# Patient Record
Sex: MALE | Race: WHITE | NOT HISPANIC OR LATINO | Employment: FULL TIME | URBAN - METROPOLITAN AREA
[De-identification: names, ages, dates, MRNs, and addresses within clinical notes are randomized per-mention and may not be internally consistent; named-entity substitution may affect disease eponyms.]

---

## 2024-10-16 ENCOUNTER — LAB (OUTPATIENT)
Dept: LAB | Facility: LAB | Age: 64
End: 2024-10-16

## 2024-10-16 ENCOUNTER — APPOINTMENT (OUTPATIENT)
Dept: PRIMARY CARE | Facility: CLINIC | Age: 64
End: 2024-10-16
Payer: COMMERCIAL

## 2024-10-16 VITALS
HEIGHT: 67 IN | BODY MASS INDEX: 28.41 KG/M2 | RESPIRATION RATE: 14 BRPM | OXYGEN SATURATION: 99 % | SYSTOLIC BLOOD PRESSURE: 170 MMHG | HEART RATE: 85 BPM | WEIGHT: 181 LBS | DIASTOLIC BLOOD PRESSURE: 88 MMHG

## 2024-10-16 DIAGNOSIS — Z00.00 ROUTINE HEALTH MAINTENANCE: ICD-10-CM

## 2024-10-16 DIAGNOSIS — Z12.5 ENCOUNTER FOR PROSTATE CANCER SCREENING: ICD-10-CM

## 2024-10-16 DIAGNOSIS — Z12.11 SCREEN FOR COLON CANCER: ICD-10-CM

## 2024-10-16 DIAGNOSIS — R79.89 ELEVATED TSH: ICD-10-CM

## 2024-10-16 DIAGNOSIS — E78.5 HYPERLIPIDEMIA, UNSPECIFIED HYPERLIPIDEMIA TYPE: ICD-10-CM

## 2024-10-16 DIAGNOSIS — Z00.00 ROUTINE HEALTH MAINTENANCE: Primary | ICD-10-CM

## 2024-10-16 DIAGNOSIS — I10 PRIMARY HYPERTENSION: ICD-10-CM

## 2024-10-16 PROCEDURE — 36415 COLL VENOUS BLD VENIPUNCTURE: CPT

## 2024-10-16 PROCEDURE — 85027 COMPLETE CBC AUTOMATED: CPT

## 2024-10-16 PROCEDURE — 80053 COMPREHEN METABOLIC PANEL: CPT

## 2024-10-16 PROCEDURE — 87389 HIV-1 AG W/HIV-1&-2 AB AG IA: CPT

## 2024-10-16 PROCEDURE — 3077F SYST BP >= 140 MM HG: CPT | Performed by: STUDENT IN AN ORGANIZED HEALTH CARE EDUCATION/TRAINING PROGRAM

## 2024-10-16 PROCEDURE — 1036F TOBACCO NON-USER: CPT | Performed by: STUDENT IN AN ORGANIZED HEALTH CARE EDUCATION/TRAINING PROGRAM

## 2024-10-16 PROCEDURE — 3008F BODY MASS INDEX DOCD: CPT | Performed by: STUDENT IN AN ORGANIZED HEALTH CARE EDUCATION/TRAINING PROGRAM

## 2024-10-16 PROCEDURE — 3079F DIAST BP 80-89 MM HG: CPT | Performed by: STUDENT IN AN ORGANIZED HEALTH CARE EDUCATION/TRAINING PROGRAM

## 2024-10-16 PROCEDURE — 86803 HEPATITIS C AB TEST: CPT

## 2024-10-16 PROCEDURE — 84153 ASSAY OF PSA TOTAL: CPT

## 2024-10-16 PROCEDURE — 84439 ASSAY OF FREE THYROXINE: CPT

## 2024-10-16 PROCEDURE — 99204 OFFICE O/P NEW MOD 45 MIN: CPT | Performed by: STUDENT IN AN ORGANIZED HEALTH CARE EDUCATION/TRAINING PROGRAM

## 2024-10-16 PROCEDURE — 84443 ASSAY THYROID STIM HORMONE: CPT

## 2024-10-16 RX ORDER — LOSARTAN POTASSIUM AND HYDROCHLOROTHIAZIDE 12.5; 5 MG/1; MG/1
1 TABLET ORAL DAILY
Qty: 30 TABLET | Refills: 2 | Status: SHIPPED | OUTPATIENT
Start: 2024-10-16 | End: 2025-10-16

## 2024-10-16 ASSESSMENT — ENCOUNTER SYMPTOMS
DIARRHEA: 0
LIGHT-HEADEDNESS: 0
NAUSEA: 0
DIZZINESS: 0
ABDOMINAL PAIN: 0
DIAPHORESIS: 0
FEVER: 0
VOMITING: 0
SHORTNESS OF BREATH: 0
UNEXPECTED WEIGHT CHANGE: 0
CHILLS: 0

## 2024-10-16 NOTE — PATIENT INSTRUCTIONS
Please get all the lab work except for the lipid panel.   Come back another day to get the lipid panel in 1 month- you must fast at least 8 hours before you get this. When you get this you will also need to get the BMP - basic metabolic panel.

## 2024-10-16 NOTE — PROGRESS NOTES
"Subjective   Patient ID: Darron Cotton is a 63 y.o. male who presents for Establish Care.  HPI    Here to establish care.  He has no acute concerns today.    Reports SBP never over 140 in past.         Review of Systems   Constitutional:  Negative for chills, diaphoresis, fever and unexpected weight change.   Respiratory:  Negative for shortness of breath.    Cardiovascular:  Negative for chest pain.        No SOB or CP going up 2 flights of stairs     Gastrointestinal:  Negative for abdominal pain, diarrhea, nausea and vomiting.   Neurological:  Negative for dizziness, syncope and light-headedness.       /88   Pulse 85   Resp 14   Ht 1.702 m (5' 7\")   Wt 82.1 kg (181 lb)   SpO2 99%   BMI 28.35 kg/m²     Objective   Physical Exam  Vitals reviewed.   Constitutional:       General: He is not in acute distress.     Appearance: Normal appearance.   HENT:      Head: Normocephalic.   Cardiovascular:      Rate and Rhythm: Normal rate and regular rhythm.   Pulmonary:      Effort: Pulmonary effort is normal. No respiratory distress.      Breath sounds: Normal breath sounds.   Abdominal:      General: There is no distension.   Musculoskeletal:         General: No deformity.   Skin:     Coloration: Skin is not jaundiced.   Neurological:      Mental Status: He is alert.         Assessment/Plan   Problem List Items Addressed This Visit       HLD (hyperlipidemia)    Primary hypertension    Relevant Medications    losartan-hydrochlorothiazide (Hyzaar) 50-12.5 mg tablet    Other Relevant Orders    Basic metabolic panel    Encounter for prostate cancer screening    Relevant Orders    Prostate Spec.Ag,Screen    Screen for colon cancer    Relevant Orders    Colonoscopy Screening; Average Risk Patient    Elevated TSH     Other Visit Diagnoses       Routine health maintenance    -  Primary    Relevant Orders    CBC    Comprehensive Metabolic Panel    TSH with reflex to Free T4 if abnormal    HIV 1/2 Antigen/Antibody Screen " with Reflex to Confirmation    Hepatitis C Antibody    Lipid Panel            Primary hypertension  -Blood pressure taken twice in the office was elevated over 160.  - Start losartan-hydrochlorothiazide, advised to get labs prior to starting today  - Monitor at home, let us know blood pressures in the next 1 to 2 weeks  - Advised common side effects lightheadedness and dizziness, usually gets better within a day or 2.  Let us know if persistent.  - Will need repeat BMP 1 month, ordered  - DASH diet advised  - Follow-up 1 month    Hyperlipidemia  - Last LDL was 169 in August 2018, total cholesterol was 225 at that time as well.  - Repeat fasting lipid panel ordered    Elevated TSH  - Repeat TSH ordered.    Health Maintenance  -Prostate Cancer Screening: Ordered, discussed risk and benefits of screening.  -Vaccinations: He defers flu vaccine, discuss at next appt.   -Lung Cancer Screening: Not indicated  -AAA Screening: Age 67  -Colonoscopy: Ordered  -Screen for HIV/Hep C: Ordered  -Advance Care Planning: He wants his wife Gay to make decisions for him if he is unable to.     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   Follow-up 1 month for CPE, sooner if needed

## 2024-10-17 ENCOUNTER — TELEPHONE (OUTPATIENT)
Dept: PRIMARY CARE | Facility: CLINIC | Age: 64
End: 2024-10-17

## 2024-10-17 LAB
ALBUMIN SERPL BCP-MCNC: 4.8 G/DL (ref 3.4–5)
ALP SERPL-CCNC: 52 U/L (ref 33–136)
ALT SERPL W P-5'-P-CCNC: 22 U/L (ref 10–52)
ANION GAP SERPL CALC-SCNC: 13 MMOL/L (ref 10–20)
AST SERPL W P-5'-P-CCNC: 20 U/L (ref 9–39)
BILIRUB SERPL-MCNC: 0.8 MG/DL (ref 0–1.2)
BUN SERPL-MCNC: 16 MG/DL (ref 6–23)
CALCIUM SERPL-MCNC: 10 MG/DL (ref 8.6–10.6)
CHLORIDE SERPL-SCNC: 102 MMOL/L (ref 98–107)
CO2 SERPL-SCNC: 29 MMOL/L (ref 21–32)
CREAT SERPL-MCNC: 1 MG/DL (ref 0.5–1.3)
EGFRCR SERPLBLD CKD-EPI 2021: 86 ML/MIN/1.73M*2
ERYTHROCYTE [DISTWIDTH] IN BLOOD BY AUTOMATED COUNT: 12.1 % (ref 11.5–14.5)
GLUCOSE SERPL-MCNC: 89 MG/DL (ref 74–99)
HCT VFR BLD AUTO: 48.7 % (ref 41–52)
HCV AB SER QL: NONREACTIVE
HGB BLD-MCNC: 16.7 G/DL (ref 13.5–17.5)
HIV 1+2 AB+HIV1 P24 AG SERPL QL IA: NONREACTIVE
MCH RBC QN AUTO: 29.6 PG (ref 26–34)
MCHC RBC AUTO-ENTMCNC: 34.3 G/DL (ref 32–36)
MCV RBC AUTO: 86 FL (ref 80–100)
NRBC BLD-RTO: 0 /100 WBCS (ref 0–0)
PLATELET # BLD AUTO: 257 X10*3/UL (ref 150–450)
POTASSIUM SERPL-SCNC: 4.4 MMOL/L (ref 3.5–5.3)
PROT SERPL-MCNC: 7.6 G/DL (ref 6.4–8.2)
PSA SERPL-MCNC: 0.4 NG/ML
RBC # BLD AUTO: 5.64 X10*6/UL (ref 4.5–5.9)
SODIUM SERPL-SCNC: 140 MMOL/L (ref 136–145)
T4 FREE SERPL-MCNC: 0.93 NG/DL (ref 0.78–1.48)
TSH SERPL-ACNC: 22.53 MIU/L (ref 0.44–3.98)
WBC # BLD AUTO: 10.4 X10*3/UL (ref 4.4–11.3)

## 2024-10-17 NOTE — TELEPHONE ENCOUNTER
----- Message from Ashkan Persaud sent at 10/17/2024  8:15 AM EDT -----  Please let him know his thyroid hormone is hypoactive and he needs to be on medicine for this.  Hepatitis C and HIV test were negative.  Prostate level was normal.  Blood counts kidney liver electrolytes are normal.  Happy to do a virtual with him to talk about thyroid in more detail.

## 2024-10-21 ENCOUNTER — APPOINTMENT (OUTPATIENT)
Dept: PRIMARY CARE | Facility: CLINIC | Age: 64
End: 2024-10-21
Payer: COMMERCIAL

## 2024-10-21 DIAGNOSIS — E03.8 SUBCLINICAL HYPOTHYROIDISM: ICD-10-CM

## 2024-10-21 DIAGNOSIS — R79.89 ELEVATED TSH: Primary | ICD-10-CM

## 2024-10-21 PROCEDURE — 99213 OFFICE O/P EST LOW 20 MIN: CPT | Performed by: STUDENT IN AN ORGANIZED HEALTH CARE EDUCATION/TRAINING PROGRAM

## 2024-10-21 RX ORDER — LEVOTHYROXINE SODIUM 25 UG/1
25 TABLET ORAL DAILY
Qty: 30 TABLET | Refills: 2 | Status: SHIPPED | OUTPATIENT
Start: 2024-10-21 | End: 2025-10-21

## 2024-10-21 ASSESSMENT — ENCOUNTER SYMPTOMS
DYSURIA: 0
DIARRHEA: 0
VOMITING: 0
POLYDIPSIA: 0
NECK PAIN: 0
SHORTNESS OF BREATH: 0
NAUSEA: 0
FEVER: 0
POLYPHAGIA: 0
CHILLS: 0
DIAPHORESIS: 0

## 2024-10-21 NOTE — PROGRESS NOTES
Subjective   Patient ID: Darron Cotton is a 63 y.o. male who presents for Results.  HPI    Virtual or Telephone Consent    An interactive audio and video telecommunication system which permits real time communications between the patient (at the originating site) and provider (at the distant site) was utilized to provide this telehealth service.   Verbal consent was requested and obtained from Darron Cotton on this date, 10/24/24 for a telehealth visit.     Denies any recent illness other than a cold in August.  He is feeling well and denies any concerns at this point.  We discussed his thyroid labs in detail.        Review of Systems   Constitutional:  Negative for chills, diaphoresis and fever.   HENT:  Negative for hearing loss.    Eyes:  Negative for visual disturbance.   Respiratory:  Negative for shortness of breath.    Cardiovascular:  Negative for chest pain.        No SOB or CP going up 2 flights of stairs   Gastrointestinal:  Negative for diarrhea, nausea and vomiting.   Endocrine: Negative for cold intolerance, heat intolerance (very mild a/w age), polydipsia, polyphagia and polyuria.        No breast enlargement, breast tenderness, or breast discharge.  Denies weakness, lethargy, hair falling out, or fatigue.    Genitourinary:  Negative for dysuria and urgency.   Musculoskeletal:  Negative for neck pain.        No neck swelling   Skin:  Negative for rash.       There were no vitals taken for this visit.    Objective   Physical Exam  Constitutional:       General: He is not in acute distress.  Pulmonary:      Effort: Pulmonary effort is normal. No respiratory distress.   Neurological:      Mental Status: He is alert.         Assessment/Plan   Problem List Items Addressed This Visit       Elevated TSH - Primary     Other Visit Diagnoses       Subclinical hypothyroidism        Relevant Medications    levothyroxine (Synthroid, Levoxyl) 25 mcg tablet    Other Relevant Orders    TSH with reflex to Free T4 if  abnormal    Thyroid Peroxidase (TPO) Antibody              Elevated TSH  - TSH 22.53, free T4 normal, explained these results to patient.  - Recommend starting levothyroxine, discussed side effects of this therapy as well as benefits.  Discussed risk of cardiovascular side effects however given his TSH elevation these would be rare and outweighed by the benefit of thyroid supplementation. Discussed to take in the morning on an empty stomach at least 30 minutes before other medications and food.  Discussed should take 4 hours prior to any iron or calcium containing supplements.  - Start levothyroxine 25 mcg once a day  - Repeat TSH in about 4 to 6 weeks.  TPO antibodies ordered.

## 2024-10-28 DIAGNOSIS — Z12.11 COLON CANCER SCREENING: Primary | ICD-10-CM

## 2024-10-28 RX ORDER — SODIUM, POTASSIUM,MAG SULFATES 17.5-3.13G
SOLUTION, RECONSTITUTED, ORAL ORAL
Qty: 354 ML | Refills: 0 | Status: SHIPPED | OUTPATIENT
Start: 2024-10-28

## 2024-12-06 ENCOUNTER — LAB (OUTPATIENT)
Dept: LAB | Facility: LAB | Age: 64
End: 2024-12-06
Payer: COMMERCIAL

## 2024-12-06 DIAGNOSIS — I10 PRIMARY HYPERTENSION: ICD-10-CM

## 2024-12-06 DIAGNOSIS — Z00.00 ROUTINE HEALTH MAINTENANCE: ICD-10-CM

## 2024-12-06 DIAGNOSIS — E03.8 SUBCLINICAL HYPOTHYROIDISM: ICD-10-CM

## 2024-12-06 PROCEDURE — 80048 BASIC METABOLIC PNL TOTAL CA: CPT

## 2024-12-06 PROCEDURE — 84443 ASSAY THYROID STIM HORMONE: CPT

## 2024-12-06 PROCEDURE — 84439 ASSAY OF FREE THYROXINE: CPT

## 2024-12-06 PROCEDURE — 36415 COLL VENOUS BLD VENIPUNCTURE: CPT

## 2024-12-06 PROCEDURE — 86376 MICROSOMAL ANTIBODY EACH: CPT

## 2024-12-06 PROCEDURE — 80061 LIPID PANEL: CPT

## 2024-12-07 LAB
ANION GAP SERPL CALC-SCNC: 13 MMOL/L (ref 10–20)
BUN SERPL-MCNC: 11 MG/DL (ref 6–23)
CALCIUM SERPL-MCNC: 9.6 MG/DL (ref 8.6–10.6)
CHLORIDE SERPL-SCNC: 103 MMOL/L (ref 98–107)
CHOLEST SERPL-MCNC: 216 MG/DL (ref 0–199)
CHOLESTEROL/HDL RATIO: 5.4
CO2 SERPL-SCNC: 28 MMOL/L (ref 21–32)
CREAT SERPL-MCNC: 0.86 MG/DL (ref 0.5–1.3)
EGFRCR SERPLBLD CKD-EPI 2021: >90 ML/MIN/1.73M*2
GLUCOSE SERPL-MCNC: 79 MG/DL (ref 74–99)
HDLC SERPL-MCNC: 40.3 MG/DL
LDLC SERPL CALC-MCNC: 147 MG/DL
NON HDL CHOLESTEROL: 176 MG/DL (ref 0–149)
POTASSIUM SERPL-SCNC: 3.7 MMOL/L (ref 3.5–5.3)
SODIUM SERPL-SCNC: 140 MMOL/L (ref 136–145)
T4 FREE SERPL-MCNC: 1.03 NG/DL (ref 0.78–1.48)
THYROPEROXIDASE AB SERPL-ACNC: >1000 IU/ML
TRIGL SERPL-MCNC: 142 MG/DL (ref 0–149)
TSH SERPL-ACNC: 15.85 MIU/L (ref 0.44–3.98)
VLDL: 28 MG/DL (ref 0–40)

## 2024-12-09 ENCOUNTER — APPOINTMENT (OUTPATIENT)
Dept: PRIMARY CARE | Facility: CLINIC | Age: 64
End: 2024-12-09

## 2024-12-09 VITALS
DIASTOLIC BLOOD PRESSURE: 96 MMHG | TEMPERATURE: 96.6 F | OXYGEN SATURATION: 98 % | SYSTOLIC BLOOD PRESSURE: 159 MMHG | HEART RATE: 73 BPM | HEIGHT: 67 IN | WEIGHT: 182.8 LBS | BODY MASS INDEX: 28.69 KG/M2

## 2024-12-09 DIAGNOSIS — E03.9 HYPOTHYROIDISM, UNSPECIFIED TYPE: Primary | ICD-10-CM

## 2024-12-09 DIAGNOSIS — I10 PRIMARY HYPERTENSION: ICD-10-CM

## 2024-12-09 DIAGNOSIS — E78.5 HYPERLIPIDEMIA, UNSPECIFIED HYPERLIPIDEMIA TYPE: ICD-10-CM

## 2024-12-09 DIAGNOSIS — E03.8 SUBCLINICAL HYPOTHYROIDISM: ICD-10-CM

## 2024-12-09 DIAGNOSIS — Z00.00 HEALTHCARE MAINTENANCE: ICD-10-CM

## 2024-12-09 PROCEDURE — 3080F DIAST BP >= 90 MM HG: CPT | Performed by: STUDENT IN AN ORGANIZED HEALTH CARE EDUCATION/TRAINING PROGRAM

## 2024-12-09 PROCEDURE — 3008F BODY MASS INDEX DOCD: CPT | Performed by: STUDENT IN AN ORGANIZED HEALTH CARE EDUCATION/TRAINING PROGRAM

## 2024-12-09 PROCEDURE — 3077F SYST BP >= 140 MM HG: CPT | Performed by: STUDENT IN AN ORGANIZED HEALTH CARE EDUCATION/TRAINING PROGRAM

## 2024-12-09 PROCEDURE — 93000 ELECTROCARDIOGRAM COMPLETE: CPT | Performed by: STUDENT IN AN ORGANIZED HEALTH CARE EDUCATION/TRAINING PROGRAM

## 2024-12-09 PROCEDURE — 99396 PREV VISIT EST AGE 40-64: CPT | Performed by: STUDENT IN AN ORGANIZED HEALTH CARE EDUCATION/TRAINING PROGRAM

## 2024-12-09 RX ORDER — LOSARTAN POTASSIUM AND HYDROCHLOROTHIAZIDE 25; 100 MG/1; MG/1
1 TABLET ORAL DAILY
Qty: 30 TABLET | Refills: 11 | Status: SHIPPED | OUTPATIENT
Start: 2024-12-09 | End: 2024-12-09

## 2024-12-09 RX ORDER — LOSARTAN POTASSIUM AND HYDROCHLOROTHIAZIDE 25; 100 MG/1; MG/1
1 TABLET ORAL DAILY
Qty: 30 TABLET | Refills: 3 | Status: SHIPPED | OUTPATIENT
Start: 2024-12-09 | End: 2025-04-08

## 2024-12-09 RX ORDER — LEVOTHYROXINE SODIUM 50 UG/1
50 TABLET ORAL DAILY
Qty: 30 TABLET | Refills: 2 | Status: SHIPPED | OUTPATIENT
Start: 2024-12-09 | End: 2025-12-09

## 2024-12-09 ASSESSMENT — ENCOUNTER SYMPTOMS
FEVER: 0
DIZZINESS: 0
VOMITING: 0
DIAPHORESIS: 0
CHILLS: 0
SHORTNESS OF BREATH: 0
DYSURIA: 0
MYALGIAS: 0
HEMATURIA: 0
LIGHT-HEADEDNESS: 0
NAUSEA: 0
DIARRHEA: 0

## 2024-12-09 ASSESSMENT — PATIENT HEALTH QUESTIONNAIRE - PHQ9
2. FEELING DOWN, DEPRESSED OR HOPELESS: NOT AT ALL
1. LITTLE INTEREST OR PLEASURE IN DOING THINGS: NOT AT ALL
SUM OF ALL RESPONSES TO PHQ9 QUESTIONS 1 AND 2: 0

## 2024-12-09 NOTE — PROGRESS NOTES
"Subjective   Patient ID: Darron Cotton is a 64 y.o. male who presents for Annual Exam.  HPI    Has decreased erections. Does not get morning erections. Has been present for at least 6 months. Sleeps ok at night, intermittent snoring.       Review of Systems   Constitutional:  Negative for chills, diaphoresis and fever.   HENT:  Negative for hearing loss.    Eyes:  Negative for visual disturbance.   Respiratory:  Negative for shortness of breath.    Cardiovascular:  Negative for chest pain.        No SOB or CP going up 2 flights of stairs   Gastrointestinal:  Negative for diarrhea, nausea and vomiting.   Genitourinary:  Negative for dysuria, genital sores, hematuria, penile discharge, penile pain, penile swelling, scrotal swelling, testicular pain and urgency.        Wakes up at night to urinate about once     Musculoskeletal:  Negative for myalgias.   Skin:  Negative for rash.   Neurological:  Negative for dizziness, syncope and light-headedness.       BP (!) 166/97   Pulse 73   Temp 35.9 °C (96.6 °F)   Ht 1.702 m (5' 7\")   Wt 82.9 kg (182 lb 12.8 oz)   SpO2 98%   BMI 28.63 kg/m²     Objective   Physical Exam  Vitals reviewed.   Constitutional:       General: He is not in acute distress.     Appearance: Normal appearance.   HENT:      Head: Normocephalic.      Right Ear: Tympanic membrane, ear canal and external ear normal. There is no impacted cerumen.      Left Ear: Tympanic membrane, ear canal and external ear normal. There is no impacted cerumen.      Mouth/Throat:      Mouth: Mucous membranes are moist.      Pharynx: Oropharynx is clear. No oropharyngeal exudate or posterior oropharyngeal erythema.   Cardiovascular:      Rate and Rhythm: Normal rate and regular rhythm.   Pulmonary:      Effort: Pulmonary effort is normal. No respiratory distress.      Breath sounds: Normal breath sounds.   Abdominal:      General: Bowel sounds are normal. There is no distension.      Palpations: Abdomen is soft. There is no " mass.      Tenderness: There is no abdominal tenderness. There is no guarding or rebound.   Musculoskeletal:         General: No deformity.      Cervical back: Neck supple. No tenderness.   Lymphadenopathy:      Cervical: No cervical adenopathy.   Skin:     Coloration: Skin is not jaundiced.   Neurological:      Mental Status: He is alert.         Assessment/Plan   Problem List Items Addressed This Visit       Primary hypertension    Relevant Medications    losartan-hydrochlorothiazide (Hyzaar) 100-25 mg tablet     Other Visit Diagnoses       Hypothyroidism, unspecified type    -  Primary    Relevant Orders    TSH with reflex to Free T4 if abnormal    Subclinical hypothyroidism        Relevant Medications    levothyroxine (Synthroid, Levoxyl) 50 mcg tablet              Elevated TSH  - TSH 22.53->15.85  - On levothyroxine 25 mcg once a day, increase to 50 mcg a day.  - Repeat TSH in about 4 to 6 weeks, ordered     Primary hypertension  -Blood pressure taken twice in the office was elevated over 160.  - On losartan-hydrochlorothiazide 50-12.5, increase to 100-25mg/day.   - Monitor at home, let us know blood pressures in the next 1 to 2 weeks, hasn't started checking.   - Advised common side effects lightheadedness and dizziness, usually gets better within a day or 2.  Let us know if persistent.  - BMP 1 month after starting was normal.   - DASH diet advised  - Follow-up 1 month     Hyperlipidemia  - Repeat lipids still up.   - Consider calcium score in future  -Will need statin, discuss at next visit given med changes today.      Health Maintenance  -Prostate Cancer Screening: Negative 10/24  -Vaccinations: He defers flu vaccine, discuss at next appt. Recommend shingrix, TDAP, covid booster.   -Lung Cancer Screening: Not indicated  -AAA Screening: Age 67  -Colonoscopy: Ordered  -Screen for HIV/Hep C: 10/16/24  -Advance Care Planning: He wants his wife Gay to make decisions for him if he is unable to.      CPE  completed.  Advised to keep a heart healthy, low fat  diet with fruits and veggies like Mediterranean diet.  Advised on the importance of exercise and maintaining 150 minutes of exercise per week (30 minutes per day 5 days a week).  Advised on regular eye and dental visits.  Discussed age appropriate cancer screening, immunizations and recommendations given.  Discussed avoiding illicit drugs and tobacco. Advised on appropriate use of alcohol.  Advised to wear seat belt.      CPE 1 year  F/u 4-6 weeks HTN

## 2024-12-10 PROBLEM — E03.9 HYPOTHYROIDISM: Status: ACTIVE | Noted: 2024-12-10

## 2025-01-06 ENCOUNTER — TELEPHONE (OUTPATIENT)
Dept: SURGERY | Facility: CLINIC | Age: 65
End: 2025-01-06
Payer: COMMERCIAL

## 2025-01-06 NOTE — TELEPHONE ENCOUNTER
Called and spoke with patient regarding a scheduling note about being on a blood thinner.  Upon talking to patient he suggested I call his wife as he was not at home.  I spoke with his wife who reviewed his medications and stated that the patient is taking a 81 mg Aspirin.  I informed his wife that I would be in contact once I hear from Dr. Newby.  I discussed with Dr. Newby who stated that it does not need to be held prior to his procedure.  Patient's wife was notified that patient does not need to hold his Aspirin 81 mg for his colonoscopy.  All questions were addressed and answered.

## 2025-01-14 ASSESSMENT — ENCOUNTER SYMPTOMS: HYPERTENSION: 1

## 2025-01-15 ENCOUNTER — ANESTHESIA EVENT (OUTPATIENT)
Dept: OPERATING ROOM | Facility: CLINIC | Age: 65
End: 2025-01-15
Payer: COMMERCIAL

## 2025-01-15 ENCOUNTER — ANESTHESIA (OUTPATIENT)
Dept: OPERATING ROOM | Facility: CLINIC | Age: 65
End: 2025-01-15
Payer: COMMERCIAL

## 2025-01-15 ENCOUNTER — HOSPITAL ENCOUNTER (OUTPATIENT)
Dept: OPERATING ROOM | Facility: CLINIC | Age: 65
Setting detail: OUTPATIENT SURGERY
Discharge: HOME | End: 2025-01-15
Payer: COMMERCIAL

## 2025-01-15 VITALS
HEIGHT: 67 IN | DIASTOLIC BLOOD PRESSURE: 92 MMHG | RESPIRATION RATE: 16 BRPM | OXYGEN SATURATION: 98 % | TEMPERATURE: 97.5 F | BODY MASS INDEX: 28.03 KG/M2 | WEIGHT: 178.57 LBS | SYSTOLIC BLOOD PRESSURE: 151 MMHG | HEART RATE: 76 BPM

## 2025-01-15 DIAGNOSIS — Z12.11 SCREEN FOR COLON CANCER: ICD-10-CM

## 2025-01-15 PROCEDURE — 3600000007 HC OR TIME - EACH INCREMENTAL 1 MINUTE - PROCEDURE LEVEL TWO: Performed by: NURSE ANESTHETIST, CERTIFIED REGISTERED

## 2025-01-15 PROCEDURE — 3700000001 HC GENERAL ANESTHESIA TIME - INITIAL BASE CHARGE: Performed by: NURSE ANESTHETIST, CERTIFIED REGISTERED

## 2025-01-15 PROCEDURE — 2500000004 HC RX 250 GENERAL PHARMACY W/ HCPCS (ALT 636 FOR OP/ED): Performed by: NURSE ANESTHETIST, CERTIFIED REGISTERED

## 2025-01-15 PROCEDURE — 7100000009 HC PHASE TWO TIME - INITIAL BASE CHARGE: Performed by: NURSE ANESTHETIST, CERTIFIED REGISTERED

## 2025-01-15 PROCEDURE — 3700000002 HC GENERAL ANESTHESIA TIME - EACH INCREMENTAL 1 MINUTE: Performed by: NURSE ANESTHETIST, CERTIFIED REGISTERED

## 2025-01-15 PROCEDURE — 7100000010 HC PHASE TWO TIME - EACH INCREMENTAL 1 MINUTE: Performed by: NURSE ANESTHETIST, CERTIFIED REGISTERED

## 2025-01-15 PROCEDURE — A45385 PR COLONOSCOPY,REMV LESN,SNARE: Performed by: NURSE ANESTHETIST, CERTIFIED REGISTERED

## 2025-01-15 PROCEDURE — 45385 COLONOSCOPY W/LESION REMOVAL: CPT | Performed by: SURGERY

## 2025-01-15 PROCEDURE — 3600000002 HC OR TIME - INITIAL BASE CHARGE - PROCEDURE LEVEL TWO: Performed by: NURSE ANESTHETIST, CERTIFIED REGISTERED

## 2025-01-15 RX ORDER — PROPOFOL 10 MG/ML
INJECTION, EMULSION INTRAVENOUS AS NEEDED
Status: DISCONTINUED | OUTPATIENT
Start: 2025-01-15 | End: 2025-01-15

## 2025-01-15 RX ORDER — LIDOCAINE HYDROCHLORIDE 20 MG/ML
INJECTION, SOLUTION INFILTRATION; PERINEURAL AS NEEDED
Status: DISCONTINUED | OUTPATIENT
Start: 2025-01-15 | End: 2025-01-15

## 2025-01-15 RX ADMIN — PROPOFOL 100 MG: 10 INJECTION, EMULSION INTRAVENOUS at 12:54

## 2025-01-15 RX ADMIN — LIDOCAINE HYDROCHLORIDE 100 MG: 20 INJECTION, SOLUTION INFILTRATION; PERINEURAL at 12:54

## 2025-01-15 RX ADMIN — PROPOFOL 200 MCG/KG/MIN: 10 INJECTION, EMULSION INTRAVENOUS at 12:55

## 2025-01-15 SDOH — HEALTH STABILITY: MENTAL HEALTH: CURRENT SMOKER: 0

## 2025-01-15 ASSESSMENT — ENCOUNTER SYMPTOMS
VOMITING: 0
COUGH: 0
SPEECH DIFFICULTY: 0
CONSTIPATION: 0
NAUSEA: 0
TROUBLE SWALLOWING: 0
FEVER: 0
UNEXPECTED WEIGHT CHANGE: 0
DIARRHEA: 0
COLOR CHANGE: 0
CONFUSION: 0
WHEEZING: 0
CHILLS: 0
ABDOMINAL DISTENTION: 0
HEADACHES: 0
ABDOMINAL PAIN: 0
DIZZINESS: 0
JOINT SWELLING: 0
ARTHRALGIAS: 0
DIFFICULTY URINATING: 0
SLEEP DISTURBANCE: 0
LIGHT-HEADEDNESS: 0
SHORTNESS OF BREATH: 0

## 2025-01-15 ASSESSMENT — COLUMBIA-SUICIDE SEVERITY RATING SCALE - C-SSRS
1. IN THE PAST MONTH, HAVE YOU WISHED YOU WERE DEAD OR WISHED YOU COULD GO TO SLEEP AND NOT WAKE UP?: NO
2. HAVE YOU ACTUALLY HAD ANY THOUGHTS OF KILLING YOURSELF?: NO
6. HAVE YOU EVER DONE ANYTHING, STARTED TO DO ANYTHING, OR PREPARED TO DO ANYTHING TO END YOUR LIFE?: NO

## 2025-01-15 ASSESSMENT — PAIN SCALES - GENERAL
PAINLEVEL_OUTOF10: 0 - NO PAIN
PAIN_LEVEL: 0
PAINLEVEL_OUTOF10: 0 - NO PAIN

## 2025-01-15 ASSESSMENT — PAIN - FUNCTIONAL ASSESSMENT
PAIN_FUNCTIONAL_ASSESSMENT: 0-10

## 2025-01-15 NOTE — ANESTHESIA POSTPROCEDURE EVALUATION
Patient: Darron Cotton    Procedure Summary       Date: 01/15/25 Room / Location: [facility] OR    Anesthesia Start: 1250 Anesthesia Stop: 1329    Procedure: COLONOSCOPY Diagnosis: Screen for colon cancer    Scheduled Providers: Keith Newby MD PhD Responsible Provider: MABEL Moore    Anesthesia Type: MAC ASA Status: 2            Anesthesia Type: MAC    Vitals Value Taken Time   /84 01/15/25 1328   Temp 36.9 °C (98.4 °F) 01/15/25 1328   Pulse 90 01/15/25 1328   Resp 16 01/15/25 1328   SpO2 96 % 01/15/25 1328       Anesthesia Post Evaluation    Patient location during evaluation: PACU  Patient participation: complete - patient participated  Level of consciousness: awake  Pain score: 0  Pain management: adequate  Airway patency: patent  Cardiovascular status: acceptable  Respiratory status: acceptable  Hydration status: acceptable  Postoperative Nausea and Vomiting: none        There were no known notable events for this encounter.

## 2025-01-15 NOTE — ANESTHESIA PREPROCEDURE EVALUATION
Patient: Darron Cotton    Procedure Information       Date/Time: 01/15/25 1250    Scheduled providers: Keith Newby MD PhD    Procedure: COLONOSCOPY    Location: [facility] OR          There were no vitals filed for this visit.    History reviewed. No pertinent surgical history.  History reviewed. No pertinent past medical history.    Current Outpatient Medications:     levothyroxine (Synthroid, Levoxyl) 50 mcg tablet, Take 1 tablet (50 mcg) by mouth early in the morning.. Take on an empty stomach at the same time each day, either 30 to 60 minutes prior to breakfast, Disp: 30 tablet, Rfl: 2    losartan-hydrochlorothiazide (Hyzaar) 100-25 mg tablet, Take 1 tablet by mouth once daily., Disp: 30 tablet, Rfl: 3    sodium,potassium,mag sulfates (Suprep) 17.5-3.13-1.6 gram recon soln solution, Take one bottle twice as directed by the prep instructions, Disp: 354 mL, Rfl: 0  Prior to Admission medications    Medication Sig Start Date End Date Taking? Authorizing Provider   levothyroxine (Synthroid, Levoxyl) 50 mcg tablet Take 1 tablet (50 mcg) by mouth early in the morning.. Take on an empty stomach at the same time each day, either 30 to 60 minutes prior to breakfast 24 Yes Ashkan Persaud,    losartan-hydrochlorothiazide (Hyzaar) 100-25 mg tablet Take 1 tablet by mouth once daily. 24 Yes Ashkan Persaud DO   sodium,potassium,mag sulfates (Suprep) 17.5-3.13-1.6 gram recon soln solution Take one bottle twice as directed by the prep instructions 10/28/24  Yes Keith Newby MD PhD     No Known Allergies  Social History     Tobacco Use    Smoking status: Former     Current packs/day: 0.00     Average packs/day: 0.1 packs/day for 3.0 years (0.3 ttl pk-yrs)     Types: Cigarettes     Start date:      Quit date:      Years since quittin.0    Smokeless tobacco: Never   Substance Use Topics    Alcohol use: Yes     Comment: <1 drink a week         Chemistry    Lab Results  "  Component Value Date/Time     12/06/2024 1331    K 3.7 12/06/2024 1331     12/06/2024 1331    CO2 28 12/06/2024 1331    BUN 11 12/06/2024 1331    CREATININE 0.86 12/06/2024 1331    Lab Results   Component Value Date/Time    CALCIUM 9.6 12/06/2024 1331    ALKPHOS 52 10/16/2024 1651    AST 20 10/16/2024 1651    ALT 22 10/16/2024 1651    BILITOT 0.8 10/16/2024 1651          Lab Results   Component Value Date/Time    WBC 10.4 10/16/2024 1651    HGB 16.7 10/16/2024 1651    HCT 48.7 10/16/2024 1651     10/16/2024 1651     No results found for: \"PROTIME\", \"PTT\", \"INR\"  Encounter Date: 12/09/24   ECG 12 lead (Clinic Performed)    Narrative    Sinus rhythm, no significant abnormalities.      Relevant Problems   Cardiac   (+) HLD (hyperlipidemia)   (+) Primary hypertension      Endocrine   (+) Hypothyroidism       Clinical information reviewed:   Tobacco  Allergies  Meds  Problems  Med Hx  Surg Hx   Fam Hx          NPO Detail:  No data recorded     Physical Exam    Airway  Mallampati: III  TM distance: >3 FB     Cardiovascular - normal exam  Rhythm: regular  Rate: normal     Dental - normal exam     Pulmonary - normal exam     Abdominal - normal exam  Abdomen: soft             Anesthesia Plan    History of general anesthesia?: yes  History of complications of general anesthesia?: no    ASA 2     MAC     The patient is not a current smoker.  Patient was not previously instructed to abstain from smoking on day of procedure.    intravenous induction   Anesthetic plan and risks discussed with patient.      "

## 2025-01-15 NOTE — H&P
History Of Present Illness  Darron Cotton is a 64 y.o. male presenting with need for screening colonoscopy.    Hypothyroid and HTN. Father with pancreatic cancer.     Past Medical History  No past medical history on file.  Surgical History  No past surgical history on file.  Social History  He reports that he quit smoking about 42 years ago. His smoking use included cigarettes. He started smoking about 45 years ago. He has a 0.3 pack-year smoking history. He has never used smokeless tobacco. He reports current alcohol use. He reports current drug use. Drug: Marijuana.    Family History  No family history on file.     Allergies  No Known Allergies  Review of Systems   Constitutional:  Negative for chills, fever and unexpected weight change.   HENT:  Negative for congestion and trouble swallowing.    Respiratory:  Negative for cough, shortness of breath and wheezing.    Cardiovascular:  Negative for chest pain.   Gastrointestinal:  Negative for abdominal distention, abdominal pain, constipation, diarrhea, nausea and vomiting.   Genitourinary:  Negative for difficulty urinating.   Musculoskeletal:  Negative for arthralgias and joint swelling.   Skin:  Negative for color change.   Neurological:  Negative for dizziness, speech difficulty, light-headedness and headaches.   Psychiatric/Behavioral:  Negative for confusion and sleep disturbance.         Physical Exam  Constitutional:       General: He is awake.      Appearance: Normal appearance.   HENT:      Head: Normocephalic and atraumatic.      Nose: Nose normal.      Mouth/Throat:      Mouth: Mucous membranes are moist.   Eyes:      Pupils: Pupils are equal, round, and reactive to light.   Neck:      Thyroid: No thyroid mass.      Trachea: Phonation normal.   Cardiovascular:      Rate and Rhythm: Normal rate and regular rhythm.      Heart sounds: Normal heart sounds. No murmur heard.     No gallop.   Pulmonary:      Effort: Pulmonary effort is normal. No respiratory  distress.      Breath sounds: Normal air entry. No decreased breath sounds, wheezing, rhonchi or rales.   Abdominal:      General: Bowel sounds are normal. There is no distension.      Palpations: Abdomen is soft.      Tenderness: There is no abdominal tenderness.   Musculoskeletal:      Cervical back: Neck supple.      Right lower leg: No edema.      Left lower leg: No edema.   Skin:     General: Skin is warm.      Capillary Refill: Capillary refill takes less than 2 seconds.   Neurological:      General: No focal deficit present.      Mental Status: He is alert and oriented to person, place, and time. Mental status is at baseline.      Cranial Nerves: Cranial nerves 2-12 are intact.      Motor: Motor function is intact.   Psychiatric:         Attention and Perception: Attention and perception normal.         Mood and Affect: Mood normal.         Speech: Speech normal.         Behavior: Behavior normal.          Last Recorded Vitals  There were no vitals taken for this visit.    Assessment/Plan   63 YO M BMI 28 with need for screening colonoscopy.    Consented.     Keith Newby MD PhD

## 2025-01-15 NOTE — DISCHARGE INSTRUCTIONS
During the first 24 hours after your procedure, you should:    - Resume normal diet, unless otherwise directed by your doctor.  - Resume your home medications, unless otherwise directed by your doctor.  - Refrain from driving or operative heavy machinery.  - Drink plenty of liquids.  - Avoid consuming alcohol.  - Avoid strenuous activity or heavy lifting.    After 24 hours, you can resume regular activity.    Call your doctor office immediately ([phone]) or come to the nearest emergency room if you experience:    - Abdominal tenderness  - Blood in your stool or vomit  - Difficulty urinating or passing stools  - Difficulty breathing  - Chest pain  - Fever       If you experience any problems or have any questions following discharge from the GI Lab, please call:   Dr. Newby at [phone].   To reach your physician after hours call [phone] and ask for the GI physician on call.

## 2025-01-20 ENCOUNTER — LAB (OUTPATIENT)
Dept: LAB | Facility: LAB | Age: 65
End: 2025-01-20
Payer: COMMERCIAL

## 2025-01-20 ENCOUNTER — APPOINTMENT (OUTPATIENT)
Dept: PRIMARY CARE | Facility: CLINIC | Age: 65
End: 2025-01-20
Payer: COMMERCIAL

## 2025-01-20 VITALS
HEIGHT: 67 IN | HEART RATE: 62 BPM | TEMPERATURE: 97.8 F | OXYGEN SATURATION: 98 % | DIASTOLIC BLOOD PRESSURE: 60 MMHG | SYSTOLIC BLOOD PRESSURE: 104 MMHG | WEIGHT: 185 LBS | BODY MASS INDEX: 29.03 KG/M2

## 2025-01-20 DIAGNOSIS — E03.9 HYPOTHYROIDISM, UNSPECIFIED TYPE: ICD-10-CM

## 2025-01-20 DIAGNOSIS — E78.5 HYPERLIPIDEMIA, UNSPECIFIED HYPERLIPIDEMIA TYPE: ICD-10-CM

## 2025-01-20 DIAGNOSIS — I10 PRIMARY HYPERTENSION: Primary | ICD-10-CM

## 2025-01-20 DIAGNOSIS — I10 PRIMARY HYPERTENSION: ICD-10-CM

## 2025-01-20 LAB
ANION GAP SERPL CALC-SCNC: 12 MMOL/L (ref 10–20)
BUN SERPL-MCNC: 13 MG/DL (ref 6–23)
CALCIUM SERPL-MCNC: 10 MG/DL (ref 8.6–10.6)
CHLORIDE SERPL-SCNC: 99 MMOL/L (ref 98–107)
CO2 SERPL-SCNC: 32 MMOL/L (ref 21–32)
CREAT SERPL-MCNC: 0.91 MG/DL (ref 0.5–1.3)
EGFRCR SERPLBLD CKD-EPI 2021: >90 ML/MIN/1.73M*2
GLUCOSE SERPL-MCNC: 80 MG/DL (ref 74–99)
POTASSIUM SERPL-SCNC: 3.5 MMOL/L (ref 3.5–5.3)
SODIUM SERPL-SCNC: 139 MMOL/L (ref 136–145)
T4 FREE SERPL-MCNC: 1.34 NG/DL (ref 0.78–1.48)
TSH SERPL-ACNC: 8.98 MIU/L (ref 0.44–3.98)

## 2025-01-20 PROCEDURE — 84439 ASSAY OF FREE THYROXINE: CPT

## 2025-01-20 PROCEDURE — 80048 BASIC METABOLIC PNL TOTAL CA: CPT

## 2025-01-20 PROCEDURE — 3074F SYST BP LT 130 MM HG: CPT | Performed by: STUDENT IN AN ORGANIZED HEALTH CARE EDUCATION/TRAINING PROGRAM

## 2025-01-20 PROCEDURE — 84443 ASSAY THYROID STIM HORMONE: CPT

## 2025-01-20 PROCEDURE — 99214 OFFICE O/P EST MOD 30 MIN: CPT | Performed by: STUDENT IN AN ORGANIZED HEALTH CARE EDUCATION/TRAINING PROGRAM

## 2025-01-20 PROCEDURE — 3078F DIAST BP <80 MM HG: CPT | Performed by: STUDENT IN AN ORGANIZED HEALTH CARE EDUCATION/TRAINING PROGRAM

## 2025-01-20 PROCEDURE — 3008F BODY MASS INDEX DOCD: CPT | Performed by: STUDENT IN AN ORGANIZED HEALTH CARE EDUCATION/TRAINING PROGRAM

## 2025-01-20 PROCEDURE — 1036F TOBACCO NON-USER: CPT | Performed by: STUDENT IN AN ORGANIZED HEALTH CARE EDUCATION/TRAINING PROGRAM

## 2025-01-20 ASSESSMENT — ENCOUNTER SYMPTOMS
CHILLS: 0
LIGHT-HEADEDNESS: 0
DIAPHORESIS: 0
WEAKNESS: 0
VOMITING: 0
DIARRHEA: 0
PALPITATIONS: 0
HEADACHES: 0
HYPERTENSION: 1
SHORTNESS OF BREATH: 0
NUMBNESS: 0
DIZZINESS: 0
FEVER: 0
NAUSEA: 0

## 2025-01-20 ASSESSMENT — PATIENT HEALTH QUESTIONNAIRE - PHQ9
1. LITTLE INTEREST OR PLEASURE IN DOING THINGS: NOT AT ALL
SUM OF ALL RESPONSES TO PHQ9 QUESTIONS 1 AND 2: 0
2. FEELING DOWN, DEPRESSED OR HOPELESS: NOT AT ALL

## 2025-01-20 NOTE — PROGRESS NOTES
"Subjective   Patient ID: Darron Cotton is a 64 y.o. male who presents for Follow-up (He is here to follow up on Blood pressure and colonoscopy results).  Hypertension  This is a chronic problem. The current episode started more than 1 month ago. The problem has been gradually improving since onset. Pertinent negatives include no chest pain, headaches, palpitations or shortness of breath.     Hasn't checked BP at home recently.  Denies any acute concerns today.    Review of Systems   Constitutional:  Negative for chills, diaphoresis and fever.   Respiratory:  Negative for shortness of breath.    Cardiovascular:  Negative for chest pain, palpitations and leg swelling.   Gastrointestinal:  Negative for diarrhea, nausea and vomiting.   Neurological:  Negative for dizziness, syncope, weakness, light-headedness, numbness and headaches.       /60   Pulse 62   Temp 36.6 °C (97.8 °F) (Temporal)   Ht 1.702 m (5' 7\")   Wt 83.9 kg (185 lb)   SpO2 98%   BMI 28.98 kg/m²     Objective   Physical Exam  Vitals reviewed.   Constitutional:       General: He is not in acute distress.     Appearance: Normal appearance.   HENT:      Head: Normocephalic.   Cardiovascular:      Rate and Rhythm: Normal rate and regular rhythm.   Pulmonary:      Effort: Pulmonary effort is normal. No respiratory distress.      Breath sounds: Normal breath sounds.   Abdominal:      General: There is no distension.   Musculoskeletal:         General: No deformity.   Skin:     Coloration: Skin is not jaundiced.   Neurological:      Mental Status: He is alert.         Assessment/Plan   Problem List Items Addressed This Visit       HLD (hyperlipidemia)    Relevant Orders    CT cardiac scoring wo IV contrast    Lipid panel    Primary hypertension - Primary    Relevant Orders    Basic metabolic panel    Hypothyroidism     Elevated TSH  - TSH 22.53->15.85  - On levothyroxine 25 mcg once a day, increase to 50 mcg a day.  - Repeat TSH due, advised to get.   "   Primary hypertension  -Blood pressure taken twice in the office was elevated over 160.  - On losartan-hydrochlorothiazide 100-25mg/day.   - Monitor at home, let us know blood pressures in the next 1 to 2 weeks, hasn't started checking.   - Will repeat BMP 1 more time  - DASH diet advised  - Well-controlled today, continue same meds reviewed ASCVD risk with him that it is greater than 7.5% and the recommendation is for statins.     Hyperlipidemia  - Repeat lipids still up.   - He is hesitant to start this today.  We discussed repeating cholesterol in 3 months but may not .  He is agreeable to this.  We also explained the calcium scoring CT to evaluate his risk for coronary disease and he is interested in this.  This is ordered.     Health Maintenance  -Prostate Cancer Screening: Negative 10/24  -Vaccinations: He defers flu vaccine, discuss at next appt. Recommend shingrix, TDAP, covid booster.   -Lung Cancer Screening: Not indicated  -AAA Screening: Age 67  -Colonoscopy: Done January 2025, repeat 5 years.  -Screen for HIV/Hep C: 10/16/24  -Advance Care Planning: He wants his wife Gay to make decisions for him if he is unable to.     Follow-up 3 months with repeat lipids, sooner if needed.

## 2025-01-21 DIAGNOSIS — E03.9 HYPOTHYROIDISM, UNSPECIFIED TYPE: Primary | ICD-10-CM

## 2025-01-21 LAB
LABORATORY COMMENT REPORT: NORMAL
PATH REPORT.FINAL DX SPEC: NORMAL
PATH REPORT.GROSS SPEC: NORMAL
PATH REPORT.TOTAL CANCER: NORMAL

## 2025-01-21 RX ORDER — LEVOTHYROXINE SODIUM 75 UG/1
75 TABLET ORAL DAILY
Qty: 30 TABLET | Refills: 2 | Status: SHIPPED | OUTPATIENT
Start: 2025-01-21 | End: 2025-04-21

## 2025-04-04 ENCOUNTER — HOSPITAL ENCOUNTER (OUTPATIENT)
Dept: RADIOLOGY | Facility: CLINIC | Age: 65
Discharge: HOME | End: 2025-04-04
Payer: COMMERCIAL

## 2025-04-04 DIAGNOSIS — I10 PRIMARY HYPERTENSION: ICD-10-CM

## 2025-04-04 DIAGNOSIS — E78.5 HYPERLIPIDEMIA, UNSPECIFIED HYPERLIPIDEMIA TYPE: ICD-10-CM

## 2025-04-04 PROCEDURE — 75571 CT HRT W/O DYE W/CA TEST: CPT

## 2025-04-04 RX ORDER — LOSARTAN POTASSIUM AND HYDROCHLOROTHIAZIDE 25; 100 MG/1; MG/1
1 TABLET ORAL DAILY
Qty: 30 TABLET | Refills: 3 | Status: SHIPPED | OUTPATIENT
Start: 2025-04-04 | End: 2025-08-02

## 2025-04-08 DIAGNOSIS — E03.9 HYPOTHYROIDISM, UNSPECIFIED TYPE: ICD-10-CM

## 2025-04-08 RX ORDER — LEVOTHYROXINE SODIUM 75 UG/1
75 TABLET ORAL DAILY
Qty: 30 TABLET | Refills: 2 | Status: SHIPPED | OUTPATIENT
Start: 2025-04-08 | End: 2025-07-07

## 2025-04-16 LAB
CHOLEST SERPL-MCNC: 207 MG/DL
CHOLEST/HDLC SERPL: 4.9 (CALC)
HDLC SERPL-MCNC: 42 MG/DL
LDLC SERPL CALC-MCNC: 134 MG/DL (CALC)
NONHDLC SERPL-MCNC: 165 MG/DL (CALC)
T4 FREE SERPL-MCNC: 1.2 NG/DL (ref 0.8–1.8)
TRIGL SERPL-MCNC: 170 MG/DL
TSH SERPL-ACNC: 7.94 MIU/L (ref 0.4–4.5)

## 2025-04-17 ASSESSMENT — ENCOUNTER SYMPTOMS
HYPERTENSION: 1
HEADACHES: 0
ORTHOPNEA: 0
PALPITATIONS: 0
NECK PAIN: 0
PND: 0
BLURRED VISION: 0
SWEATS: 0
SHORTNESS OF BREATH: 0

## 2025-04-21 ENCOUNTER — APPOINTMENT (OUTPATIENT)
Dept: PRIMARY CARE | Facility: CLINIC | Age: 65
End: 2025-04-21
Payer: COMMERCIAL

## 2025-04-21 VITALS
DIASTOLIC BLOOD PRESSURE: 74 MMHG | HEIGHT: 67 IN | SYSTOLIC BLOOD PRESSURE: 122 MMHG | BODY MASS INDEX: 28.85 KG/M2 | RESPIRATION RATE: 16 BRPM | OXYGEN SATURATION: 95 % | WEIGHT: 183.8 LBS | HEART RATE: 82 BPM | TEMPERATURE: 99 F

## 2025-04-21 DIAGNOSIS — I10 PRIMARY HYPERTENSION: ICD-10-CM

## 2025-04-21 DIAGNOSIS — E03.9 HYPOTHYROIDISM, UNSPECIFIED TYPE: ICD-10-CM

## 2025-04-21 DIAGNOSIS — R35.1 BENIGN PROSTATIC HYPERPLASIA WITH NOCTURIA: Primary | ICD-10-CM

## 2025-04-21 DIAGNOSIS — E78.5 HYPERLIPIDEMIA, UNSPECIFIED HYPERLIPIDEMIA TYPE: ICD-10-CM

## 2025-04-21 DIAGNOSIS — N40.1 BENIGN PROSTATIC HYPERPLASIA WITH NOCTURIA: Primary | ICD-10-CM

## 2025-04-21 PROCEDURE — 3008F BODY MASS INDEX DOCD: CPT | Performed by: STUDENT IN AN ORGANIZED HEALTH CARE EDUCATION/TRAINING PROGRAM

## 2025-04-21 PROCEDURE — 3078F DIAST BP <80 MM HG: CPT | Performed by: STUDENT IN AN ORGANIZED HEALTH CARE EDUCATION/TRAINING PROGRAM

## 2025-04-21 PROCEDURE — 1036F TOBACCO NON-USER: CPT | Performed by: STUDENT IN AN ORGANIZED HEALTH CARE EDUCATION/TRAINING PROGRAM

## 2025-04-21 PROCEDURE — 99215 OFFICE O/P EST HI 40 MIN: CPT | Performed by: STUDENT IN AN ORGANIZED HEALTH CARE EDUCATION/TRAINING PROGRAM

## 2025-04-21 PROCEDURE — 3074F SYST BP LT 130 MM HG: CPT | Performed by: STUDENT IN AN ORGANIZED HEALTH CARE EDUCATION/TRAINING PROGRAM

## 2025-04-21 RX ORDER — LEVOTHYROXINE SODIUM 88 UG/1
88 TABLET ORAL DAILY
Qty: 30 TABLET | Refills: 2 | Status: SHIPPED | OUTPATIENT
Start: 2025-04-21 | End: 2025-07-20

## 2025-04-21 RX ORDER — TADALAFIL 5 MG/1
5 TABLET ORAL DAILY
Qty: 30 TABLET | Refills: 2 | Status: SHIPPED | OUTPATIENT
Start: 2025-04-21 | End: 2025-07-20

## 2025-04-21 ASSESSMENT — PAIN SCALES - GENERAL: PAINLEVEL_OUTOF10: 0-NO PAIN

## 2025-04-21 ASSESSMENT — ENCOUNTER SYMPTOMS
FEVER: 0
DIAPHORESIS: 0
CHILLS: 0
NECK PAIN: 0
HEADACHES: 0
SHORTNESS OF BREATH: 0
LIGHT-HEADEDNESS: 0
VOMITING: 0
CONSTIPATION: 0
DIARRHEA: 0
PALPITATIONS: 0
DIZZINESS: 0
NAUSEA: 0

## 2025-04-21 NOTE — PROGRESS NOTES
Subjective   Patient ID: Darron Cotton is a 64 y.o. male who presents for Follow-up (3 month f/u).  History of Present Illness  Answers submitted by the patient for this visit:  High Blood Pressure Questionnaire (Submitted on 4/17/2025)  Chief Complaint: Hypertension  Chronicity: recurrent  Onset: more than 1 month ago  Progression since onset: rapidly improving  Condition status: controlled  anxiety: No  blurred vision: No  malaise/fatigue: No  orthopnea: No  peripheral edema: No  PND: No  sweats: No  Agents associated with hypertension: thyroid hormones  CAD risks: no known risk factors  Compliance problems: no compliance problems      Darron Cotton is a 64-year-old male with hypertension, elevated TSH, and hyperlipidemia here for a three-month follow-up.    Hypertension is well controlled with losartan hydrochlorothiazide. Blood pressure remains stable, not exceeding 140 mmHg, and he feels good overall.    He had elevated TSH and is on levothyroxine 75 mcg once daily. TSH levels have improved over the past six months, decreasing from 22 to 7.94, with a normal T4. He takes his medication consistently on an empty stomach in the morning. His partner notes he is 'not as sleepy' and 'eating better' than before, indicating some improvement in symptoms.    He has hyperlipidemia and has been hesitant to start statins. A recent lipid panel showed improvement in cholesterol levels, with LDL decreasing from 147 to 134 mg/dL. He underwent a calcium scoring test in April, which showed a score of 17.83.    He experiences erectile dysfunction over the past year, with difficulty maintaining an erection and infrequent morning erections. His wife suggests stress may be a contributing factor, and he acknowledges having a stressful job.    He wakes up at night to urinate, usually once, and drinks Gatorade throughout the night.    No chest pain, leg swelling, shortness of breath, skin rash, nausea, vomiting, diarrhea, constipation,  "lightheadedness, dizziness, syncope, fever, chills, sweats, vision or hearing changes, and trouble with temperature extremes.    Review of Systems   Constitutional:  Negative for chills, diaphoresis and fever.   HENT:  Negative for hearing loss.    Eyes:  Negative for visual disturbance.   Respiratory:  Negative for shortness of breath.    Cardiovascular:  Negative for chest pain, palpitations and leg swelling.   Gastrointestinal:  Negative for constipation, diarrhea, nausea and vomiting.   Endocrine: Negative for cold intolerance and heat intolerance.   Musculoskeletal:  Negative for neck pain.   Skin:  Negative for rash.   Neurological:  Negative for dizziness, syncope, light-headedness and headaches.       Objective     /74 (BP Location: Left arm, Patient Position: Sitting, BP Cuff Size: Adult)   Pulse 82   Temp 37.2 °C (99 °F) (Skin)   Resp 16   Ht 1.702 m (5' 7\")   Wt 83.4 kg (183 lb 12.8 oz)   SpO2 95%   BMI 28.79 kg/m²      Physical Exam  GENERAL: Alert, cooperative, well developed, no acute distress, no diaphoresis.  CHEST: Clear to auscultation bilaterally, no wheezes, rhonchi, or rales.  CARDIOVASCULAR: Normal heart rate and rhythm, S1 and S2 normal without murmurs, rubs, or gallops.  EXTREMITIES: No cyanosis or edema.  NEUROLOGICAL: Moves all extremities without gross motor deficit.    Assessment & Plan  Hypothyroidism  Suboptimal control with recent TSH of 7.94. Previous TSH levels improved from 22 to 8.98 over six months. Reports improved energy and appetite.  - Increase levothyroxine to 88 mcg once daily.  - Repeat TSH in 6 weeks.  - Instruct to take current 75 mcg dose daily and double dose on Sundays until new prescription is filled.    Primary Hypertension  Well-controlled on losartan-hydrochlorothiazide with blood pressure readings below 140 mmHg.    Hyperlipidemia  LDL reduced from 147 to 134 mg/dL. Total cholesterol remains elevated but improved. Calcium score of 17.83 indicates " lower risk for coronary artery disease with an annual risk of 0.4%. Current ten-year risk of heart attack or stroke is 9.5%. Discussed benefits of statin therapy and recommended this, but he remains hesitant. Emphasized lifestyle modifications including diet and exercise.  - Continue lifestyle modifications including diet and exercise.  - Review repeat lipid panel results at next visit.    Erectile Dysfunction  Present for over a year, possibly related to stress, hypertension, or hypothyroidism. Discussed role of stress and physiological response. Interested in medication. Discussed Cialis for ED and potential prostate benefits. Explained potential side effects including hearing loss, hypotension, prolonged erection, vision changes, flushing, upset stomach, headache, back pain, limb pain, muscle ache, and runny nose. Emphasized monitoring blood pressure while on medication. Discussed red flag signs which would prompt ER such as prolonged erection, vision loss, etc.   - Prescribe Cialis 5 mg once daily.  - Instruct to monitor blood pressure while on medication.  - Advise to report any severe side effects such as prolonged erection or sudden vision changes and go to ER if these occur.     General Health Maintenance  Discussed discontinuation of aspirin due to age and lack of heart disease, as per USPSTF guidelines. Emphasized importance of lifestyle modifications for cardiovascular health.  - Discontinue aspirin therapy.      Health Maintenance  -Prostate Cancer Screening: Negative 10/24  -Vaccinations: He defers flu vaccine, discuss at next appt. Recommend shingrix, TDAP, covid booster.   -Lung Cancer Screening: Not indicated  -AAA Screening: Age 67  -Colonoscopy: Done January 2025, repeat 5 years.  -Screen for HIV/Hep C: 10/16/24  -Advance Care Planning: He wants his wife Gay to make decisions for him if he is unable to.     F/u 3 months, sooner prn      Results  LABS  TSH: 7.94 (04/15/2025)  T4: within normal  limits (04/15/2025)  LDL: 134 (12/31/2024)    RADIOLOGY  Calcium score: 17.83 (04/2025)       Ashkan Persaud DO     This medical note was created with the assistance of artificial intelligence (AI) for documentation purposes. The content has been reviewed and confirmed by the healthcare provider for accuracy and completeness. Patient consented to the use of audio recording and use of AI during their visit.

## 2025-04-22 PROBLEM — N40.1 BENIGN PROSTATIC HYPERPLASIA WITH NOCTURIA: Status: ACTIVE | Noted: 2025-04-22

## 2025-04-22 PROBLEM — R35.1 BENIGN PROSTATIC HYPERPLASIA WITH NOCTURIA: Status: ACTIVE | Noted: 2025-04-22

## 2025-05-06 DIAGNOSIS — J06.9 ACUTE URI: Primary | ICD-10-CM

## 2025-05-06 RX ORDER — AMOXICILLIN AND CLAVULANATE POTASSIUM 875; 125 MG/1; MG/1
875 TABLET, FILM COATED ORAL 2 TIMES DAILY
Qty: 14 TABLET | Refills: 0 | Status: SHIPPED | OUTPATIENT
Start: 2025-05-06 | End: 2025-05-13

## 2025-07-05 DIAGNOSIS — E03.9 HYPOTHYROIDISM, UNSPECIFIED TYPE: ICD-10-CM

## 2025-07-05 DIAGNOSIS — N40.1 BENIGN PROSTATIC HYPERPLASIA WITH NOCTURIA: ICD-10-CM

## 2025-07-05 DIAGNOSIS — R35.1 BENIGN PROSTATIC HYPERPLASIA WITH NOCTURIA: ICD-10-CM

## 2025-07-07 RX ORDER — TADALAFIL 5 MG/1
5 TABLET ORAL DAILY
Qty: 30 TABLET | Refills: 2 | Status: SHIPPED | OUTPATIENT
Start: 2025-07-07 | End: 2025-10-05

## 2025-07-07 RX ORDER — LEVOTHYROXINE SODIUM 88 UG/1
TABLET ORAL
Qty: 30 TABLET | Refills: 2 | Status: SHIPPED | OUTPATIENT
Start: 2025-07-07

## 2025-07-21 ENCOUNTER — APPOINTMENT (OUTPATIENT)
Dept: PRIMARY CARE | Facility: CLINIC | Age: 65
End: 2025-07-21
Payer: COMMERCIAL

## 2025-08-05 DIAGNOSIS — I10 PRIMARY HYPERTENSION: ICD-10-CM

## 2025-08-05 RX ORDER — LOSARTAN POTASSIUM AND HYDROCHLOROTHIAZIDE 25; 100 MG/1; MG/1
1 TABLET ORAL DAILY
Qty: 90 TABLET | Refills: 2 | Status: SHIPPED | OUTPATIENT
Start: 2025-08-05

## 2025-08-22 ENCOUNTER — APPOINTMENT (OUTPATIENT)
Dept: PRIMARY CARE | Facility: CLINIC | Age: 65
End: 2025-08-22
Payer: COMMERCIAL

## 2025-09-03 ENCOUNTER — APPOINTMENT (OUTPATIENT)
Dept: PRIMARY CARE | Facility: CLINIC | Age: 65
End: 2025-09-03
Payer: COMMERCIAL

## 2025-10-03 ENCOUNTER — APPOINTMENT (OUTPATIENT)
Dept: PRIMARY CARE | Facility: CLINIC | Age: 65
End: 2025-10-03
Payer: COMMERCIAL